# Patient Record
Sex: MALE | Race: WHITE | NOT HISPANIC OR LATINO | URBAN - METROPOLITAN AREA
[De-identification: names, ages, dates, MRNs, and addresses within clinical notes are randomized per-mention and may not be internally consistent; named-entity substitution may affect disease eponyms.]

---

## 2017-02-07 ENCOUNTER — OUTPATIENT (OUTPATIENT)
Dept: OUTPATIENT SERVICES | Facility: HOSPITAL | Age: 52
LOS: 1 days | End: 2017-02-07
Payer: COMMERCIAL

## 2017-02-07 ENCOUNTER — APPOINTMENT (OUTPATIENT)
Age: 52
End: 2017-02-07

## 2017-02-07 DIAGNOSIS — M54.16 RADICULOPATHY, LUMBAR REGION: ICD-10-CM

## 2017-02-07 PROCEDURE — 62323 NJX INTERLAMINAR LMBR/SAC: CPT

## 2017-08-29 ENCOUNTER — APPOINTMENT (OUTPATIENT)
Dept: NEUROLOGY | Facility: CLINIC | Age: 52
End: 2017-08-29
Payer: COMMERCIAL

## 2017-08-29 VITALS
WEIGHT: 235 LBS | BODY MASS INDEX: 34.8 KG/M2 | HEIGHT: 69 IN | DIASTOLIC BLOOD PRESSURE: 98 MMHG | HEART RATE: 84 BPM | SYSTOLIC BLOOD PRESSURE: 147 MMHG

## 2017-08-29 DIAGNOSIS — M51.24 OTHER INTERVERTEBRAL DISC DISPLACEMENT, THORACIC REGION: ICD-10-CM

## 2017-08-29 DIAGNOSIS — M54.16 RADICULOPATHY, LUMBAR REGION: ICD-10-CM

## 2017-08-29 DIAGNOSIS — G25.82 STIFF-MAN SYNDROME: ICD-10-CM

## 2017-08-29 PROCEDURE — 99205 OFFICE O/P NEW HI 60 MIN: CPT

## 2017-08-29 RX ORDER — BACLOFEN 10 MG/1
10 TABLET ORAL
Qty: 30 | Refills: 0 | Status: DISCONTINUED | COMMUNITY
Start: 2017-02-27

## 2017-08-29 RX ORDER — GABAPENTIN 800 MG/1
800 TABLET, COATED ORAL
Qty: 270 | Refills: 0 | Status: ACTIVE | COMMUNITY
Start: 2017-07-18

## 2017-08-29 RX ORDER — ETODOLAC 500 MG/1
500 TABLET, FILM COATED, EXTENDED RELEASE ORAL
Qty: 60 | Refills: 0 | Status: DISCONTINUED | COMMUNITY
Start: 2017-08-14

## 2017-08-31 LAB — GAD65 AB SER-MCNC: 0.01 NMOL/L

## 2017-09-05 LAB — PANC ISLET CELL AB SER QL: <5 JDF UNITS

## 2017-09-14 DIAGNOSIS — E80.20 UNSPECIFIED PORPHYRIA: ICD-10-CM

## 2017-09-22 ENCOUNTER — LABORATORY RESULT (OUTPATIENT)
Age: 52
End: 2017-09-22

## 2017-09-27 ENCOUNTER — LABORATORY RESULT (OUTPATIENT)
Age: 52
End: 2017-09-27

## 2017-10-05 ENCOUNTER — APPOINTMENT (OUTPATIENT)
Dept: NEUROLOGY | Facility: CLINIC | Age: 52
End: 2017-10-05
Payer: COMMERCIAL

## 2017-10-05 VITALS
HEART RATE: 91 BPM | SYSTOLIC BLOOD PRESSURE: 151 MMHG | DIASTOLIC BLOOD PRESSURE: 108 MMHG | BODY MASS INDEX: 34.8 KG/M2 | WEIGHT: 235 LBS | HEIGHT: 69 IN

## 2017-10-05 DIAGNOSIS — G89.4 CHRONIC PAIN SYNDROME: ICD-10-CM

## 2017-10-05 PROCEDURE — 99213 OFFICE O/P EST LOW 20 MIN: CPT

## 2017-11-07 ENCOUNTER — APPOINTMENT (OUTPATIENT)
Dept: NEUROLOGY | Facility: CLINIC | Age: 52
End: 2017-11-07

## 2017-11-27 ENCOUNTER — APPOINTMENT (OUTPATIENT)
Dept: CT IMAGING | Facility: CLINIC | Age: 52
End: 2017-11-27
Payer: COMMERCIAL

## 2017-11-27 ENCOUNTER — OUTPATIENT (OUTPATIENT)
Dept: OUTPATIENT SERVICES | Facility: HOSPITAL | Age: 52
LOS: 1 days | End: 2017-11-27
Payer: COMMERCIAL

## 2017-11-27 DIAGNOSIS — Z00.8 ENCOUNTER FOR OTHER GENERAL EXAMINATION: ICD-10-CM

## 2017-11-27 PROCEDURE — 70486 CT MAXILLOFACIAL W/O DYE: CPT

## 2017-11-27 PROCEDURE — 70486 CT MAXILLOFACIAL W/O DYE: CPT | Mod: 26

## 2017-12-07 ENCOUNTER — APPOINTMENT (OUTPATIENT)
Dept: MRI IMAGING | Facility: HOSPITAL | Age: 52
End: 2017-12-07

## 2017-12-07 ENCOUNTER — OUTPATIENT (OUTPATIENT)
Dept: OUTPATIENT SERVICES | Facility: HOSPITAL | Age: 52
LOS: 1 days | End: 2017-12-07
Payer: COMMERCIAL

## 2017-12-07 VITALS
TEMPERATURE: 97 F | SYSTOLIC BLOOD PRESSURE: 130 MMHG | HEART RATE: 85 BPM | DIASTOLIC BLOOD PRESSURE: 70 MMHG | OXYGEN SATURATION: 100 % | RESPIRATION RATE: 16 BRPM

## 2017-12-07 VITALS
OXYGEN SATURATION: 97 % | SYSTOLIC BLOOD PRESSURE: 137 MMHG | HEART RATE: 88 BPM | RESPIRATION RATE: 16 BRPM | DIASTOLIC BLOOD PRESSURE: 80 MMHG

## 2017-12-07 DIAGNOSIS — I67.1 CEREBRAL ANEURYSM, NONRUPTURED: ICD-10-CM

## 2017-12-07 PROCEDURE — A9585: CPT

## 2017-12-07 PROCEDURE — 72156 MRI NECK SPINE W/O & W/DYE: CPT | Mod: 26

## 2017-12-07 PROCEDURE — 72156 MRI NECK SPINE W/O & W/DYE: CPT

## 2017-12-07 PROCEDURE — 70553 MRI BRAIN STEM W/O & W/DYE: CPT

## 2017-12-07 PROCEDURE — 70553 MRI BRAIN STEM W/O & W/DYE: CPT | Mod: 26

## 2018-02-06 ENCOUNTER — APPOINTMENT (OUTPATIENT)
Age: 53
End: 2018-02-06

## 2018-03-13 ENCOUNTER — OUTPATIENT (OUTPATIENT)
Dept: OUTPATIENT SERVICES | Facility: HOSPITAL | Age: 53
LOS: 1 days | End: 2018-03-13
Payer: COMMERCIAL

## 2018-03-13 ENCOUNTER — APPOINTMENT (OUTPATIENT)
Age: 53
End: 2018-03-13

## 2018-03-13 DIAGNOSIS — M54.16 RADICULOPATHY, LUMBAR REGION: ICD-10-CM

## 2018-03-13 DIAGNOSIS — M54.17 RADICULOPATHY, LUMBOSACRAL REGION: ICD-10-CM

## 2018-03-13 PROCEDURE — 64484 NJX AA&/STRD TFRM EPI L/S EA: CPT

## 2018-03-13 PROCEDURE — 64483 NJX AA&/STRD TFRM EPI L/S 1: CPT

## 2018-08-10 ENCOUNTER — APPOINTMENT (OUTPATIENT)
Dept: PULMONOLOGY | Facility: CLINIC | Age: 53
End: 2018-08-10

## 2018-08-10 ENCOUNTER — APPOINTMENT (OUTPATIENT)
Dept: PULMONOLOGY | Facility: CLINIC | Age: 53
End: 2018-08-10
Payer: COMMERCIAL

## 2018-08-10 VITALS
OXYGEN SATURATION: 100 % | BODY MASS INDEX: 34.8 KG/M2 | WEIGHT: 235 LBS | SYSTOLIC BLOOD PRESSURE: 138 MMHG | HEIGHT: 69 IN | HEART RATE: 104 BPM | DIASTOLIC BLOOD PRESSURE: 91 MMHG

## 2018-08-10 DIAGNOSIS — Z87.891 PERSONAL HISTORY OF NICOTINE DEPENDENCE: ICD-10-CM

## 2018-08-10 LAB — POCT - HEMOGLOBIN (HGB), QUANTITATIVE, TRANSCUTANEOUS: 16

## 2018-08-10 PROCEDURE — 94726 PLETHYSMOGRAPHY LUNG VOLUMES: CPT

## 2018-08-10 PROCEDURE — 94060 EVALUATION OF WHEEZING: CPT

## 2018-08-10 PROCEDURE — 94750: CPT

## 2018-08-10 PROCEDURE — 88738 HGB QUANT TRANSCUTANEOUS: CPT

## 2018-08-10 PROCEDURE — 94729 DIFFUSING CAPACITY: CPT

## 2018-08-10 PROCEDURE — 99204 OFFICE O/P NEW MOD 45 MIN: CPT | Mod: 25

## 2018-08-10 RX ORDER — TRAZODONE HYDROCHLORIDE 50 MG/1
50 TABLET ORAL
Qty: 90 | Refills: 0 | Status: DISCONTINUED | COMMUNITY
Start: 2017-05-08 | End: 2018-08-10

## 2018-08-10 RX ORDER — OXYCODONE HYDROCHLORIDE 30 MG/1
30 TABLET, FILM COATED, EXTENDED RELEASE ORAL
Qty: 60 | Refills: 0 | Status: DISCONTINUED | COMMUNITY
Start: 2017-03-27 | End: 2018-08-10

## 2018-08-10 RX ORDER — CYCLOBENZAPRINE HYDROCHLORIDE 10 MG/1
10 TABLET, FILM COATED ORAL
Refills: 0 | Status: ACTIVE | COMMUNITY

## 2018-08-10 RX ORDER — DIAZEPAM 10 MG/1
10 TABLET ORAL
Qty: 30 | Refills: 0 | Status: DISCONTINUED | COMMUNITY
Start: 2017-02-27 | End: 2018-08-10

## 2018-08-10 RX ORDER — OXYCODONE HYDROCHLORIDE 15 MG/1
15 TABLET ORAL
Qty: 180 | Refills: 0 | Status: DISCONTINUED | COMMUNITY
Start: 2017-02-27 | End: 2018-08-10

## 2018-08-10 RX ORDER — DULOXETINE HYDROCHLORIDE 60 MG/1
60 CAPSULE, DELAYED RELEASE PELLETS ORAL
Qty: 90 | Refills: 0 | Status: DISCONTINUED | COMMUNITY
Start: 2017-07-18 | End: 2018-08-10

## 2018-09-03 ENCOUNTER — FORM ENCOUNTER (OUTPATIENT)
Age: 53
End: 2018-09-03

## 2018-09-07 ENCOUNTER — APPOINTMENT (OUTPATIENT)
Dept: PULMONOLOGY | Facility: CLINIC | Age: 53
End: 2018-09-07
Payer: MEDICARE

## 2018-09-07 VITALS — OXYGEN SATURATION: 99 % | SYSTOLIC BLOOD PRESSURE: 151 MMHG | HEART RATE: 105 BPM | DIASTOLIC BLOOD PRESSURE: 99 MMHG

## 2018-09-07 DIAGNOSIS — M79.7 FIBROMYALGIA: ICD-10-CM

## 2018-09-07 PROCEDURE — 99213 OFFICE O/P EST LOW 20 MIN: CPT

## 2018-09-07 RX ORDER — LORAZEPAM 2 MG/1
TABLET ORAL
Refills: 0 | Status: DISCONTINUED | COMMUNITY
End: 2018-09-07

## 2018-09-08 PROBLEM — M79.7 FIBROMYALGIA: Status: ACTIVE | Noted: 2018-09-08

## 2018-10-10 ENCOUNTER — EMERGENCY (EMERGENCY)
Facility: HOSPITAL | Age: 53
LOS: 1 days | Discharge: HOME | End: 2018-10-10
Attending: EMERGENCY MEDICINE | Admitting: EMERGENCY MEDICINE
Payer: COMMERCIAL

## 2018-10-10 VITALS
OXYGEN SATURATION: 100 % | DIASTOLIC BLOOD PRESSURE: 98 MMHG | SYSTOLIC BLOOD PRESSURE: 153 MMHG | TEMPERATURE: 98 F | RESPIRATION RATE: 16 BRPM | HEART RATE: 84 BPM

## 2018-10-10 VITALS
HEART RATE: 116 BPM | RESPIRATION RATE: 18 BRPM | DIASTOLIC BLOOD PRESSURE: 116 MMHG | OXYGEN SATURATION: 99 % | TEMPERATURE: 99 F | SYSTOLIC BLOOD PRESSURE: 167 MMHG

## 2018-10-10 LAB
ALBUMIN SERPL ELPH-MCNC: 4.3 G/DL — SIGNIFICANT CHANGE UP (ref 3.3–5)
ALP SERPL-CCNC: 60 U/L — SIGNIFICANT CHANGE UP (ref 40–120)
ALT FLD-CCNC: 29 U/L — SIGNIFICANT CHANGE UP (ref 4–41)
APTT BLD: 26.8 SEC — LOW (ref 27.5–37.4)
AST SERPL-CCNC: 29 U/L — SIGNIFICANT CHANGE UP (ref 4–40)
BASOPHILS # BLD AUTO: 0.09 K/UL — SIGNIFICANT CHANGE UP (ref 0–0.2)
BASOPHILS NFR BLD AUTO: 1.1 % — SIGNIFICANT CHANGE UP (ref 0–2)
BILIRUB SERPL-MCNC: 0.3 MG/DL — SIGNIFICANT CHANGE UP (ref 0.2–1.2)
BUN SERPL-MCNC: 14 MG/DL — SIGNIFICANT CHANGE UP (ref 7–23)
CALCIUM SERPL-MCNC: 9.3 MG/DL — SIGNIFICANT CHANGE UP (ref 8.4–10.5)
CHLORIDE SERPL-SCNC: 100 MMOL/L — SIGNIFICANT CHANGE UP (ref 98–107)
CO2 SERPL-SCNC: 21 MMOL/L — LOW (ref 22–31)
CREAT SERPL-MCNC: 0.77 MG/DL — SIGNIFICANT CHANGE UP (ref 0.5–1.3)
EOSINOPHIL # BLD AUTO: 0.07 K/UL — SIGNIFICANT CHANGE UP (ref 0–0.5)
EOSINOPHIL NFR BLD AUTO: 0.9 % — SIGNIFICANT CHANGE UP (ref 0–6)
GLUCOSE SERPL-MCNC: 109 MG/DL — HIGH (ref 70–99)
HCT VFR BLD CALC: 44 % — SIGNIFICANT CHANGE UP (ref 39–50)
HGB BLD-MCNC: 15.5 G/DL — SIGNIFICANT CHANGE UP (ref 13–17)
IMM GRANULOCYTES # BLD AUTO: 0.03 # — SIGNIFICANT CHANGE UP
IMM GRANULOCYTES NFR BLD AUTO: 0.4 % — SIGNIFICANT CHANGE UP (ref 0–1.5)
INR BLD: 1.04 — SIGNIFICANT CHANGE UP (ref 0.88–1.17)
LYMPHOCYTES # BLD AUTO: 2.2 K/UL — SIGNIFICANT CHANGE UP (ref 1–3.3)
LYMPHOCYTES # BLD AUTO: 27.7 % — SIGNIFICANT CHANGE UP (ref 13–44)
MCHC RBC-ENTMCNC: 30.3 PG — SIGNIFICANT CHANGE UP (ref 27–34)
MCHC RBC-ENTMCNC: 35.2 % — SIGNIFICANT CHANGE UP (ref 32–36)
MCV RBC AUTO: 86.1 FL — SIGNIFICANT CHANGE UP (ref 80–100)
MONOCYTES # BLD AUTO: 0.67 K/UL — SIGNIFICANT CHANGE UP (ref 0–0.9)
MONOCYTES NFR BLD AUTO: 8.4 % — SIGNIFICANT CHANGE UP (ref 2–14)
NEUTROPHILS # BLD AUTO: 4.89 K/UL — SIGNIFICANT CHANGE UP (ref 1.8–7.4)
NEUTROPHILS NFR BLD AUTO: 61.5 % — SIGNIFICANT CHANGE UP (ref 43–77)
NRBC # FLD: 0 — SIGNIFICANT CHANGE UP
PLATELET # BLD AUTO: 288 K/UL — SIGNIFICANT CHANGE UP (ref 150–400)
PMV BLD: 10.9 FL — SIGNIFICANT CHANGE UP (ref 7–13)
POTASSIUM SERPL-MCNC: 4 MMOL/L — SIGNIFICANT CHANGE UP (ref 3.5–5.3)
POTASSIUM SERPL-SCNC: 4 MMOL/L — SIGNIFICANT CHANGE UP (ref 3.5–5.3)
PROT SERPL-MCNC: 7.6 G/DL — SIGNIFICANT CHANGE UP (ref 6–8.3)
PROTHROM AB SERPL-ACNC: 11.5 SEC — SIGNIFICANT CHANGE UP (ref 9.8–13.1)
RBC # BLD: 5.11 M/UL — SIGNIFICANT CHANGE UP (ref 4.2–5.8)
RBC # FLD: 13 % — SIGNIFICANT CHANGE UP (ref 10.3–14.5)
SODIUM SERPL-SCNC: 135 MMOL/L — SIGNIFICANT CHANGE UP (ref 135–145)
TROPONIN T, HIGH SENSITIVITY: < 6 NG/L — SIGNIFICANT CHANGE UP (ref ?–14)
WBC # BLD: 7.95 K/UL — SIGNIFICANT CHANGE UP (ref 3.8–10.5)
WBC # FLD AUTO: 7.95 K/UL — SIGNIFICANT CHANGE UP (ref 3.8–10.5)

## 2018-10-10 PROCEDURE — 70450 CT HEAD/BRAIN W/O DYE: CPT | Mod: 26,59

## 2018-10-10 PROCEDURE — 70498 CT ANGIOGRAPHY NECK: CPT | Mod: 26

## 2018-10-10 PROCEDURE — 99284 EMERGENCY DEPT VISIT MOD MDM: CPT

## 2018-10-10 PROCEDURE — 70496 CT ANGIOGRAPHY HEAD: CPT | Mod: 26

## 2018-10-10 PROCEDURE — 99285 EMERGENCY DEPT VISIT HI MDM: CPT

## 2018-10-10 NOTE — ED PROVIDER NOTE - MEDICAL DECISION MAKING DETAILS
Gwendolyn:  pt with sudden onset of R facial numbness, headache and gait disturbance, h/o similar episode 1 yr ago w non-specific signal abnormality on MRI brain, followed by Dr. hopkins (neuro).  pt will need repeat imaging and neuro consult in ED.

## 2018-10-10 NOTE — ED PROVIDER NOTE - OBJECTIVE STATEMENT
53 year old M with hx of HLD, fibromyalgia, presents with complaint of numbness and "tight" pain to right side of body.  He states that he was driving his car at 730 this am when he had sudden onset numbness and pain to right LE, UE right anterior neck and right upper lip associated with feeling of disorientation and mild weakness to R side and right sided headache.  He states that symptoms were 8/10 at that time. He then drove to physical therapy where symptoms decreased to 4/10. After PT, symptoms worsened and he noted unsteady gait. He then came to ED.  He denies slurred speech, dizziness, chest pains, shortness of breath, neck stiffness or fever.  Patient states that he had similar symptoms about a year ago and he was evaluated for stroke at that time.  He had abnormal CT findings for which he underwent outpatient MRI which showed possible demyelinating disease but after following up with a neuromuscular specialist, he was told he did not have demyelinating disease.

## 2018-10-10 NOTE — CONSULT NOTE ADULT - ATTENDING COMMENTS
53-year-old right-handed white gentleman first evaluated at [facility] on 10/10/18 with right-sided numbness and imbalance. He has a "small fiber neuropathy" followed by Dr. Schumacher and so has some degree of chronic numbness in his feet. On 10/9/18, he developed right-sided numbness including leg, arm and right side of his lips. At the same time, he developed imbalance, nausea and had a bifrontal headache. There is a remote history of cocaine abuse, smoking and alcohol abuse, but none for the past 15 years. ROS otherwise negative. Exam. Alert and attentive; no limb ataxia; no right-sided sensory deficits; gait not tested; remainder of neurologic exam was nonfocal. CT head (10/9/18) to my eye was unremarkable. Impression. On 10/9/18 he developed right-sided numbness and imbalance. His presentation may localize to the left side of brain, perhaps thalamic or even brain stem although the localization remains uncertain. Etiology is unclear, but a small ischemic stroke is possible, of uncertain mechanism. Suggest. MRI brain/MRA neck and head (needs at least p.o. sedation and possibly may require anesthesia); CTA neck and head can be done instead of MRA if MRI/MRA is delayed; aspirin 81 mg daily; atorvastatin 80 mg daily-can be adjusted depending on LDL; PT/OT; PM&R consultation.

## 2018-10-10 NOTE — CONSULT NOTE ADULT - ASSESSMENT
53y RH  Male PMH small fiber neuropathy, multiple C spine fracture from fall s/p repair in 2015, umbilical hernia s/p repair p/w R sided numbness, dizziness, headache. NIHSS 0, MRS 0. CT head negative for infarct, hemorrhage, mass effect. TPA not given due to mild to absent deficits. Patient with prior episode w/ similar symptoms earlier in the year, states that he's received workup for it but no record in the system. Prior MRI in 12/17 w/ nonspecific T2/Flair signal changes. R sided numbness, dizziness 2/2 L brain dysfunction 2/2 unknown etiology. Given hx of prior trauma, C-spine fracture/surgery, would recommend additional vessel imaging.     Impression: R sided numbness, dizziness 2/2 L brain dysfunction 2/2 unknown etiology    Recs:  Would get MRI brain without contrast to look at the extent and distribution of the stroke  CTA H/N to look at the cerebral vasculature.   Aspirin for secondary stroke prevention at this time. Atorvastatin 80, titrate the dose according to LDL.   DVT prophylaxis, Neurochecks  Permissive HTN up to 220/120 mmHg for first 24 hours after the symptom onset followed by gradual normotension.   HbA1C and LDL.

## 2018-10-10 NOTE — ED ADULT TRIAGE NOTE - CHIEF COMPLAINT QUOTE
c/o right side body weakness and severe headache one hour ago. Patient is evaluated by Dr. Keenan, code lauro was called . Patient is directed to CT-Scan, charge nurse informed. Hx: TIA

## 2018-10-10 NOTE — ED PROVIDER NOTE - PROGRESS NOTE DETAILS
[employer]:  Code stroke note.  pt presents with feeling off for 1 hour.  pt states he had acute onset of numbness and heaviness on the right side.  states his gait was off.  he felt disoriented but that is now better.  had headache at the time.  no motor defect at this time but feels 'off' and numb on the right. Neurology spoke with patient's private neurologist who recoments MRI,. CTA negative for acute pathology.   as outpatient. Patient feeling well and symptoms have resolved at this time.  Patient offered placement in observation to obtain MRI. patient states that he would like to get MRI at home.  Risks and benefits of outpatient vs inpatient MRI discussed and shared decision making implemented.  Patient again states he prefers outpatient work up.  He is neurologically intact with steady gait.

## 2018-10-10 NOTE — ED ADULT NURSE NOTE - NSIMPLEMENTINTERV_GEN_ALL_ED
Implemented All Fall Risk Interventions:  [place] to call system. Call bell, personal items and telephone within reach. Instruct patient to call for assistance. Room bathroom lighting operational. Non-slip footwear when patient is off stretcher. Physically safe environment: no spills, clutter or unnecessary equipment. Stretcher in lowest position, wheels locked, appropriate side rails in place. Provide visual cue, wrist band, yellow gown, etc. Monitor gait and stability. Monitor for mental status changes and reorient to person, place, and time. Review medications for side effects contributing to fall risk. Reinforce activity limits and safety measures with patient and family.

## 2018-10-10 NOTE — ED PROVIDER NOTE - CHIEF COMPLAINT
The patient is a 53y Male complaining of The patient is a 53y Male complaining of numbness to right side

## 2018-10-10 NOTE — ED ADULT NURSE NOTE - OBJECTIVE STATEMENT
Pt presents to room 4 as a code stroke, A&Ox3, ambulatory at baseline without assistance, here for evaluation of sudden onset right sided arm and leg heaviness with numbness and tingling, right sided headache, right sided lip numbness and tingling, and abnormal gait. pt also reports feeling off. Denies any chest pain, headache, slurred speech, dizziness, arm drift, nausea, vomiting, shortness of breath, palpitations, diarrhea, fever, constipation, or chills. IV established in left ac with a 20g, labs drawn and sent, call bell in reach, side rails up, bed in locked position, md evaluation in progress, pt on telemetry-NSR @ 86 noted, will continue to monitor.

## 2018-10-10 NOTE — CONSULT NOTE ADULT - SUBJECTIVE AND OBJECTIVE BOX
Neurology Consult    Reason for consult: R sided numbness, dizziness    HPI:  53y RH  Male PMH small fiber neuropathy, multiple C spine fracture s/p repair in 2015, umbilical hernia s/p repair p/w R sided numbness, dizziness. LKW 0730. At 745 day of admission, patient was driving when he started feeling disoriented, felt that his R face, arm, leg felt numb. He also complained of dizziness(described as lightheadedness, not room spinning) as well as headache(8/10 intensity, bitemporal, worsening with palpation, sharp). As per patient, has had a similar event in March 2018 with R sided numbness and dizziness which was less intense prior. Patient also has a hx of neuropathic pain for which he follows up with two neurologists(Dr. Rosado, Dr. Benavidez), is taking gabapentin for his pain; he recently had a skin biopsy which was positive for small fiber neuropathy but etiology currently uncertain.     REVIEW OF SYSTEMS:  As above    MEDICATIONS    PMH:      PSH: Quadriceps tendon rupture  Incarcerated umbilical hernia    FAMILY HISTORY:    No history of dementia, strokes, or seizures     SOCIAL HISTORY:  No history of tobacco or alcohol use     Allergies    Mobic (Urticaria; Rash)    Intolerances    Vital Signs Last 24 Hrs  T(C): 37.1 (10 Oct 2018 09:19), Max: 37.1 (10 Oct 2018 09:19)  T(F): 98.7 (10 Oct 2018 09:19), Max: 98.7 (10 Oct 2018 09:19)  HR: 116 (10 Oct 2018 09:19) (116 - 116)  BP: 167/116 (10 Oct 2018 09:19) (167/116 - 167/116)  BP(mean): --  RR: 18 (10 Oct 2018 09:19) (18 - 18)  SpO2: 99% (10 Oct 2018 09:19) (99% - 99%)    Neurological Examination:    Mental Status: Patient is alert, awake, oriented X3. Patient is fluent, no dysarthria, no aphasia. Follows commands well and able to answer questions appropriately. Mood and affect normal.    Cranial Nerves: PERRL, EOMI, visual field intact, V1-V3 intact, no gross facial asymmetry, tongue/uvula midline    Motor Exam: No drift  Right upper extremity: 5/5  Left upper extremity: 5/5  Right lower extremity: 5/5  Left lower extremity: 5/5    Normal bulk/tone    Sensory: Intact to light touch bilaterally. No extinction    Coordination: Finger to nose intact bilaterally     Gait: Normal      Reflexes: Bilateral 2+ Biceps, Brachial, Patellar, Ankle  Plantar: Down bilateral    GENERAL: No acute distress  HEENT:  Normocephalic, atraumatic  EXTREMITIES: No edema, clubbing, cyanosis  MUSCULOSKELETAL: Normal range of motion  SKIN: No rashes    LABS:    Hemoglobin A1C:     RADIOLOGY:  CT head:  MRI:

## 2018-12-14 ENCOUNTER — APPOINTMENT (OUTPATIENT)
Dept: PULMONOLOGY | Facility: CLINIC | Age: 53
End: 2018-12-14
Payer: COMMERCIAL

## 2018-12-14 VITALS
HEART RATE: 99 BPM | BODY MASS INDEX: 31.84 KG/M2 | DIASTOLIC BLOOD PRESSURE: 91 MMHG | OXYGEN SATURATION: 98 % | TEMPERATURE: 98.2 F | HEIGHT: 69 IN | RESPIRATION RATE: 16 BRPM | WEIGHT: 215 LBS | SYSTOLIC BLOOD PRESSURE: 142 MMHG

## 2018-12-14 PROBLEM — M79.7 FIBROMYALGIA: Chronic | Status: ACTIVE | Noted: 2018-10-10

## 2018-12-14 PROBLEM — E78.5 HYPERLIPIDEMIA, UNSPECIFIED: Chronic | Status: ACTIVE | Noted: 2018-10-10

## 2018-12-14 PROCEDURE — 99213 OFFICE O/P EST LOW 20 MIN: CPT

## 2018-12-28 ENCOUNTER — APPOINTMENT (OUTPATIENT)
Dept: ANESTHESIOLOGY | Facility: CLINIC | Age: 53
End: 2018-12-28

## 2018-12-28 ENCOUNTER — OUTPATIENT (OUTPATIENT)
Dept: OUTPATIENT SERVICES | Facility: HOSPITAL | Age: 53
LOS: 1 days | End: 2018-12-28
Payer: COMMERCIAL

## 2018-12-28 DIAGNOSIS — M54.16 RADICULOPATHY, LUMBAR REGION: ICD-10-CM

## 2018-12-28 PROCEDURE — 64483 NJX AA&/STRD TFRM EPI L/S 1: CPT

## 2018-12-30 PROCEDURE — 95813 EEG EXTND MNTR 61-119 MIN: CPT

## 2018-12-30 PROCEDURE — G0400: CPT

## 2019-01-07 DIAGNOSIS — M54.17 RADICULOPATHY, LUMBOSACRAL REGION: ICD-10-CM

## 2019-01-09 ENCOUNTER — FORM ENCOUNTER (OUTPATIENT)
Age: 54
End: 2019-01-09

## 2019-01-11 ENCOUNTER — APPOINTMENT (OUTPATIENT)
Dept: PULMONOLOGY | Facility: CLINIC | Age: 54
End: 2019-01-11
Payer: COMMERCIAL

## 2019-01-11 VITALS
RESPIRATION RATE: 16 BRPM | OXYGEN SATURATION: 96 % | HEIGHT: 69 IN | DIASTOLIC BLOOD PRESSURE: 93 MMHG | HEART RATE: 88 BPM | TEMPERATURE: 98.6 F | SYSTOLIC BLOOD PRESSURE: 145 MMHG | BODY MASS INDEX: 31.84 KG/M2 | WEIGHT: 215 LBS

## 2019-01-11 PROCEDURE — 99213 OFFICE O/P EST LOW 20 MIN: CPT

## 2019-01-12 NOTE — HISTORY OF PRESENT ILLNESS
[FreeTextEntry1] : Patient here for followup of overnight sleep  study.  study demonstrated evidence of significant sleep stage misperception with a much higher sleep percentage than the patient reported. In addition loud snoring was noted. The patient does note that on the night of the study he took both Valium and trazodone which is unusual for him he was concerned that he otherwise would not sleep.\par \par We had a long discussion about best to go forward. He does have a report of significant insomnia and has been refractory to most medications. He demonstrates sleep state misperception and has findings that suggest apnea. I suggest he undergo laboratory polysomnography for assessment of TESSA. There is a concern that he may not sleep in the lab and that is a legitimate concern. However I don't think that I will be able to treat him for sleep apnea based on a home study. I don't think that he will have a reliable enough recording on a home study.

## 2019-03-31 ENCOUNTER — FORM ENCOUNTER (OUTPATIENT)
Age: 54
End: 2019-03-31

## 2019-04-16 ENCOUNTER — APPOINTMENT (OUTPATIENT)
Dept: PULMONOLOGY | Facility: CLINIC | Age: 54
End: 2019-04-16
Payer: MEDICARE

## 2019-04-16 VITALS
DIASTOLIC BLOOD PRESSURE: 99 MMHG | OXYGEN SATURATION: 97 % | SYSTOLIC BLOOD PRESSURE: 138 MMHG | TEMPERATURE: 98.5 F | HEART RATE: 95 BPM

## 2019-04-16 DIAGNOSIS — G47.00 INSOMNIA, UNSPECIFIED: ICD-10-CM

## 2019-04-16 DIAGNOSIS — G47.33 OBSTRUCTIVE SLEEP APNEA (ADULT) (PEDIATRIC): ICD-10-CM

## 2019-04-16 PROCEDURE — 99213 OFFICE O/P EST LOW 20 MIN: CPT

## 2019-04-16 NOTE — ASSESSMENT
[FreeTextEntry1] : Sleep apnea combined with insomnia. Recommend cognitive behavioral therapy and oral appliance therapy.

## 2019-04-16 NOTE — HISTORY OF PRESENT ILLNESS
[FreeTextEntry1] : Sleep apnea and insomnia. Here for followup. Underwent laboratory sleep study. Patient reports that he subjectively slept better in the laboratory then he would have at home.Continues to have insomnia issues. Having cranial headaches related to muscle spasms as well

## 2021-08-20 NOTE — STROKE CODE NOTE - MILD OR ISOLATED NEUROLOGICAL DEFICITS (EXCEPT FOR APHASIA OR HEMIANOPSIA)
Interesting article:  https://sony.BuyWithMe/automatic-weight-[email or web address]    The Case for Richard Aguiar  
Statement Selected

## 2022-07-20 ENCOUNTER — APPOINTMENT (OUTPATIENT)
Dept: MRI IMAGING | Facility: CLINIC | Age: 57
End: 2022-07-20

## 2022-08-18 ENCOUNTER — APPOINTMENT (OUTPATIENT)
Dept: PODIATRY | Facility: CLINIC | Age: 57
End: 2022-08-18

## 2022-08-18 VITALS — HEIGHT: 69 IN | WEIGHT: 200 LBS | BODY MASS INDEX: 29.62 KG/M2

## 2022-08-18 DIAGNOSIS — S90.122A CONTUSION OF LEFT LESSER TOE(S) W/OUT DAMAGE TO NAIL, INITIAL ENCOUNTER: ICD-10-CM

## 2022-08-18 DIAGNOSIS — S92.354A NONDISPLACED FRACTURE OF FIFTH METATARSAL BONE, RIGHT FOOT, INITIAL ENCOUNTER FOR CLOSED FRACTURE: ICD-10-CM

## 2022-08-18 DIAGNOSIS — M19.071 PRIMARY OSTEOARTHRITIS, RIGHT ANKLE AND FOOT: ICD-10-CM

## 2022-08-18 DIAGNOSIS — M77.42 METATARSALGIA, LEFT FOOT: ICD-10-CM

## 2022-08-18 DIAGNOSIS — S90.31XA CONTUSION OF RIGHT FOOT, INITIAL ENCOUNTER: ICD-10-CM

## 2022-08-18 DIAGNOSIS — M79.661 PAIN IN RIGHT LOWER LEG: ICD-10-CM

## 2022-08-18 DIAGNOSIS — M84.374D STRESS FRACTURE, RIGHT FOOT, SUBSEQUENT ENCOUNTER FOR FRACTURE WITH ROUTINE HEALING: ICD-10-CM

## 2022-08-18 DIAGNOSIS — S92.534A NONDISPLACED FRACTURE OF DISTAL PHALANX OF RIGHT LESSER TOE(S), INITIAL ENCOUNTER FOR CLOSED FRACTURE: ICD-10-CM

## 2022-08-18 DIAGNOSIS — M79.674 PAIN IN RIGHT TOE(S): ICD-10-CM

## 2022-08-18 DIAGNOSIS — M25.471 EFFUSION, RIGHT ANKLE: ICD-10-CM

## 2022-08-18 DIAGNOSIS — M84.374A STRESS FRACTURE, RIGHT FOOT, INITIAL ENCOUNTER FOR FRACTURE: ICD-10-CM

## 2022-08-18 DIAGNOSIS — Z91.89 OTHER SPECIFIED PERSONAL RISK FACTORS, NOT ELSEWHERE CLASSIFIED: ICD-10-CM

## 2022-08-18 DIAGNOSIS — M77.31 CALCANEAL SPUR, RIGHT FOOT: ICD-10-CM

## 2022-08-18 DIAGNOSIS — S93.505A UNSPECIFIED SPRAIN OF LEFT LESSER TOE(S), INITIAL ENCOUNTER: ICD-10-CM

## 2022-08-18 DIAGNOSIS — M77.32 CALCANEAL SPUR, LEFT FOOT: ICD-10-CM

## 2022-08-18 DIAGNOSIS — M77.41 METATARSALGIA, RIGHT FOOT: ICD-10-CM

## 2022-08-18 DIAGNOSIS — R26.2 DIFFICULTY IN WALKING, NOT ELSEWHERE CLASSIFIED: ICD-10-CM

## 2022-08-18 DIAGNOSIS — M79.675 PAIN IN LEFT TOE(S): ICD-10-CM

## 2022-08-18 DIAGNOSIS — S90.121A CONTUSION OF RIGHT LESSER TOE(S) W/OUT DAMAGE TO NAIL, INITIAL ENCOUNTER: ICD-10-CM

## 2022-08-18 DIAGNOSIS — R60.0 LOCALIZED EDEMA: ICD-10-CM

## 2022-08-18 PROCEDURE — 99212 OFFICE O/P EST SF 10 MIN: CPT | Mod: 25

## 2022-08-18 PROCEDURE — 11719 TRIM NAIL(S) ANY NUMBER: CPT | Mod: 59

## 2022-08-18 PROCEDURE — 11056 PARNG/CUTG B9 HYPRKR LES 2-4: CPT

## 2022-08-18 RX ORDER — MILNACIPRAN HYDROCHLORIDE 12.5-25-5
12.5 & 25 & 5 KIT ORAL
Refills: 0 | Status: DISCONTINUED | COMMUNITY
End: 2022-08-18

## 2022-08-18 RX ORDER — DICLOFENAC SODIUM 75 MG/1
75 TABLET, DELAYED RELEASE ORAL
Refills: 0 | Status: ACTIVE | COMMUNITY

## 2022-08-18 RX ORDER — TRAMADOL HYDROCHLORIDE 50 MG/1
50 TABLET, COATED ORAL
Refills: 0 | Status: ACTIVE | COMMUNITY

## 2022-08-18 RX ORDER — IBUPROFEN AND FAMOTIDINE 800; 26.6 MG/1; MG/1
TABLET, COATED ORAL
Refills: 0 | Status: DISCONTINUED | COMMUNITY
End: 2022-08-18

## 2022-08-18 RX ORDER — TAMSULOSIN HYDROCHLORIDE 0.4 MG/1
0.4 CAPSULE ORAL
Refills: 0 | Status: DISCONTINUED | COMMUNITY
End: 2022-08-18

## 2022-08-18 RX ORDER — CHLORZOXAZONE 250 MG/1
250 TABLET ORAL
Refills: 0 | Status: ACTIVE | COMMUNITY

## 2022-08-18 RX ORDER — OXYCODONE 20 MG/1
20 TABLET ORAL
Refills: 0 | Status: ACTIVE | COMMUNITY

## 2022-08-18 RX ORDER — PRAVASTATIN SODIUM 40 MG/1
40 TABLET ORAL
Refills: 0 | Status: ACTIVE | COMMUNITY

## 2022-08-23 NOTE — HISTORY OF PRESENT ILLNESS
[Sneakers] : wai [FreeTextEntry1] : Patient presents today multiple problems.  Patient has idiopathic neuropathy with pins and needles; decreased sensation in the forefeet.  Patient also has elongated toenails.   Patient also has plantar foot pain and he thinks that he has worsening hammertoes.  Patient has bilateral heel pain and he has a history of heel pain.  Most recently the pain has worsened after wearing non-supportive slippers.  Previously, his plantar fasciitis was managed with injection therapy.  Currently, he wears custom orthotics; however, the uppers are very worn out.

## 2022-08-23 NOTE — PROCEDURE
[FreeTextEntry1] : Impression: Pain in right foot.  Pain in left foot.  Onychodystrophy.  Idiopathic neuropathy.  Hammertoes.  Metatarsalgia, bilateral.  Plantar fasciitis, bilateral.  Hyperkeratosis, bilateral.\par \par Treatment: Discussed patient conditions, etiology and treatment options.  Patient has an NSAID allergy, therefore prescribed a Medrol dose pack to decrease inflammation.\par Added metatarsal pads to the orthotics to decrease the pressure on the metatarsal heads.  Would like the patient to have the orthotics refurbished as the current decreased padding is causing increased pressure and possible exacerbation of symptoms.  \par Discussed stretching exercises for the plantar fasciitis.  If not improved at next appointment, will consider injection therapy at that time. \par Nails 1 through 5, bilateral were trimmed with sterile nippers.  The hyperkeratotic lesions were pared with sterile #23 blade.\par Follow up in 2 - 3 weeks.  Avoid walking barefoot and neuropathic precautions discussed.

## 2022-08-23 NOTE — PHYSICAL EXAM
[Ankle Swelling (On Exam)] : present [Ankle Swelling Bilaterally] : bilaterally  [Ankle Swelling On The Right] : mild [2+] : left foot dorsalis pedis 2+ [Vibration Dec.] : diminished vibratory sensation at the level of the toes [Position Sense Dec.] : diminished position sense at the level of the toes [Diminished Throughout Right Foot] : diminished sensation with monofilament testing throughout right foot [Diminished Throughout Left Foot] : diminished sensation with monofilament testing throughout left foot [FreeTextEntry3] : CFT: Instantaneous.  Temperature gradient: warm to cool. [de-identified] : Semi-flexible hammertoes 2 through 5, bilateral.  Muscle strength: 5/5.  Pedal joint ROM is intact.  Pain on palpation at the plantar met. heads 2 through 5, bilateral but no swelling noted.  No erythema.  Negative Muldner's click.  Pain on palpation at the medial calcaneal tubercles, bilateral with no edema or erythema.  There is bilateral Tailor's bunions. [FreeTextEntry1] : Decreased sharp/dull.  Decreased light touch.  Decreased hot/cold.  Positive Paresthesias.  5.07 monofilament is absent on the right 3, 5, submet. 2 and submet. 5 absent.

## 2022-09-01 ENCOUNTER — APPOINTMENT (OUTPATIENT)
Dept: PODIATRY | Facility: CLINIC | Age: 57
End: 2022-09-01

## 2022-09-13 ENCOUNTER — APPOINTMENT (OUTPATIENT)
Dept: PODIATRY | Facility: CLINIC | Age: 57
End: 2022-09-13

## 2022-09-13 VITALS — HEIGHT: 69 IN | BODY MASS INDEX: 29.62 KG/M2 | WEIGHT: 200 LBS

## 2022-09-13 DIAGNOSIS — M77.40 METATARSALGIA, UNSPECIFIED FOOT: ICD-10-CM

## 2022-09-13 PROCEDURE — 20600 DRAIN/INJ JOINT/BURSA W/O US: CPT | Mod: LT

## 2022-09-13 PROCEDURE — 73630 X-RAY EXAM OF FOOT: CPT | Mod: RT

## 2022-09-13 PROCEDURE — 99213 OFFICE O/P EST LOW 20 MIN: CPT | Mod: 25

## 2022-09-15 PROBLEM — M77.40 METATARSALGIA, UNSPECIFIED FOOT: Status: ACTIVE | Noted: 2022-08-23

## 2022-09-15 RX ORDER — METHYLPREDNISOLONE 4 MG/1
4 TABLET ORAL AS DIRECTED
Qty: 1 | Refills: 0 | Status: DISCONTINUED | COMMUNITY
Start: 2022-08-18 | End: 2022-09-15

## 2022-09-15 NOTE — PROCEDURE
[FreeTextEntry1] : X-rays: (3 views - bilateral feet) No acute fractures or dislocations noted.  No erosions noted.  On the right side, the patient has evidence of a healed metatarsal diaphysis fracture and a plantar calcaneal spur.  On the left side, patient has 5th metatarsal accessory bone, which is unchanged from prior.  Superior and inferior calcaneal spurs.  \par Vascular calcification is present, bilateral.  Hammertoe deformity 2 through 5, bilaterally.

## 2022-09-15 NOTE — PHYSICAL EXAM
[Ankle Swelling (On Exam)] : present [Ankle Swelling Bilaterally] : bilaterally  [Ankle Swelling On The Right] : mild [2+] : left foot dorsalis pedis 2+ no [Vibration Dec.] : diminished vibratory sensation at the level of the toes [Position Sense Dec.] : diminished position sense at the level of the toes [Diminished Throughout Right Foot] : diminished sensation with monofilament testing throughout right foot [Diminished Throughout Left Foot] : diminished sensation with monofilament testing throughout left foot [FreeTextEntry3] : CFT: Instantaneous.  Temperature gradient: warm to cool. [de-identified] : Semi-flexible hammertoes 2 through 5, bilateral.  Muscle strength: 5/5.  Pedal joint ROM is intact.  Pain on palpation at the plantar met. heads 2 through 5, bilateral but no swelling noted.  No erythema.  Negative Muldner's click.  Pain on palpation at the medial calcaneal tubercles, bilateral with no edema or erythema.  There is bilateral Tailor's bunions.  He has the most pain on palpation at the 2nd MPJ's, bilaterally with the left worse than the right.  No swelling, no erythema.   [FreeTextEntry1] : Decreased sharp/dull.  Decreased light touch.  Decreased hot/cold.  Positive Paresthesias.  5.07 monofilament is absent on the right 3, 5, submet. 2 and submet. 5 absent.

## 2022-09-15 NOTE — ASSESSMENT
[FreeTextEntry1] : Impression: Pain in right foot, pain in left foot (M79).  IIdiopathic neuropathy.  Hammertoes (M20.4).  Metatarsalgia, bilateral (77.4).  Plantar fasciitis, bilateral (M72.2).  Hyperkeratosis, bilateral (L85.1).  Bilateral capsulitis (M77.5).\par \par Treatment: Discussed patient conditions, etiology and treatment options.  \par I feel that the plantar fasciitis is improving and he is to continue current treatment options for that including the supportive shoes with orthotics, no barefoot walking and stretching.\par For the capsulitis, an injection was given into the left  2nd MPJ.\par Patient was placed in the treatment chair in supine position.  After obtaining verbal consent time, out was performed to the identified area of maximal tenderness over the joint site.  The foot was prepped utilizing 70% alcohol solution.  \par A local injection consisting of 0.5cc's of 0.5% Bupivacaine and 2mg of Dexamethasone was infiltrated into the joint.  Improvement in pain was observed following the injection. \par Patient was advised to apply intermittent ice when he gets home.  If he continues to have symptoms at follow up, an injection may be considered for the right side.  \par Advised patient to go to physical therapy.  Patient has a rheumatology appointment coming up as he had an episode of bilateral foot and hand swelling as well as a rash that was unexplained.  Rheumatologic conditions can worsen foot problems.  \par Patient is interested in refurbishment of the orthotics and he was referred to the orthotist.\par Return: 2 weeks.  \par \par

## 2022-09-15 NOTE — HISTORY OF PRESENT ILLNESS
[Sneakers] : wai [FreeTextEntry1] : Patient presents today for management of bilateral heel pain and bilateral metatarsalgia.  The heel pain has improved as well as the metatarsal, bilateral with a Medrol dose pack; however, once he stopped taking it, the forefoot pain increased.  Patient has decreased his physical activities, only doing recumbent biking due to multiple areas of arthritis in his body.  Patient has been doing stretching at home consistently.  No new traumas reported.  The metatarsal pads added to his orthotics that have worn out uppers have helped to some degree as well.

## 2022-09-27 ENCOUNTER — APPOINTMENT (OUTPATIENT)
Dept: PODIATRY | Facility: CLINIC | Age: 57
End: 2022-09-27

## 2022-10-11 ENCOUNTER — APPOINTMENT (OUTPATIENT)
Dept: PODIATRY | Facility: CLINIC | Age: 57
End: 2022-10-11
Payer: COMMERCIAL

## 2022-10-11 VITALS — HEIGHT: 69 IN | BODY MASS INDEX: 29.62 KG/M2 | WEIGHT: 200 LBS

## 2022-10-11 PROCEDURE — 20550 NJX 1 TENDON SHEATH/LIGAMENT: CPT | Mod: 50

## 2022-10-17 NOTE — ASSESSMENT
[FreeTextEntry1] : Impression: Pain in right foot, pain in left foot (M79). IIdiopathic neuropathy. Hammertoes (M20.4). Metatarsalgia, bilateral (77.4). Plantar fasciitis, bilateral (M72.2). Hyperkeratosis, bilateral (L85.1). Bilateral capsulitis (M77.5).\par \par Treatment: Advised patient to continue with physical therapy and daily stretching exercises.  I replaced the metatarsal pads in his custom orthotics; advised him to see the orthotist for refurbishment as well.  \par Patient is interested in bilateral plantar fasciitis injection.  Advised patient to do one at a time; however, he said that he has had bilateral injections done in the past without any negative effects.  \par Patient was placed in the treatment chair in supine position. After obtaining verbal consent time, out was performed to the identified area of maximal tenderness over the plantare fascia. The foot was prepped utilizing 70% alcohol solution. \par A local injection consisting of 1cc of 0.5% Bupivacaine, 4mg of Dexamethasone and 2.5mg Triamcinolone acetate was infiltrated from the medial aspect of the heel into the plantar fascia. Improvement in pain was observed following the injection. Patient was cautioned against high impact activity as it may cause fascial rupture.\par Patient was advised to apply intermittent ice when he gets home.\par Return: one month. \par \par

## 2022-10-17 NOTE — PHYSICAL EXAM
[Ankle Swelling (On Exam)] : present [Ankle Swelling Bilaterally] : bilaterally  [Ankle Swelling On The Right] : mild [2+] : left foot dorsalis pedis 2+ [Vibration Dec.] : diminished vibratory sensation at the level of the toes [Position Sense Dec.] : diminished position sense at the level of the toes [Diminished Throughout Right Foot] : diminished sensation with monofilament testing throughout right foot [Diminished Throughout Left Foot] : diminished sensation with monofilament testing throughout left foot [FreeTextEntry3] : CFT: Instantaneous.  Temperature gradient: warm to cool. [de-identified] : Semiflexible hammertoes 2 through 5, bilaterally.  Pain on palpation of the 2nd met. heads, bilaterally with mild swelling.  Pain on palpation medial calcaneal tubercles, bilaterally.  Negative Muldner's click of all interspaces, bilaterally.  Mild Tailor's bunions, bilaterally.  Less pain on palpation at the 2nd MPJ's compared to prior exam.   [FreeTextEntry1] : Decreased sharp/dull.  Decreased light touch.  Decreased hot/cold.  Positive Paresthesias.  5.07 monofilament is absent on the right 3, 5, submet. 2 and submet. 5 absent.

## 2022-10-17 NOTE — HISTORY OF PRESENT ILLNESS
[Sneakers] : wai [FreeTextEntry1] : Patient presents today for management of bilateral plantar fasciitis and 2nd metatarsal capsulitis as well as metatarsalgia, bilaterally.  Patient states that the metatarsalgia and capsulitis on the left has improved by about 35% since injection given last visit.  It has become more tolerable.  He has scheduled an appointment with physical therapy but he has not started it yet.  He will start this week.   He has been doing stretching exercises at home and wearing supportive shoes with orthotics.  He has not scheduled the appointment with the orthotist yet to get his orthotics refurbished.  \par

## 2022-11-14 ENCOUNTER — APPOINTMENT (OUTPATIENT)
Dept: PODIATRY | Facility: CLINIC | Age: 57
End: 2022-11-14
Payer: COMMERCIAL

## 2022-11-14 PROCEDURE — 11719 TRIM NAIL(S) ANY NUMBER: CPT | Mod: 59

## 2022-11-14 PROCEDURE — 11056 PARNG/CUTG B9 HYPRKR LES 2-4: CPT

## 2022-11-14 PROCEDURE — 20550 NJX 1 TENDON SHEATH/LIGAMENT: CPT | Mod: 59,LT

## 2022-11-17 ENCOUNTER — APPOINTMENT (OUTPATIENT)
Dept: PODIATRY | Facility: CLINIC | Age: 57
End: 2022-11-17

## 2022-11-17 PROCEDURE — 20550 NJX 1 TENDON SHEATH/LIGAMENT: CPT | Mod: LT

## 2022-11-25 NOTE — HISTORY OF PRESENT ILLNESS
[Sneakers] : wai [FreeTextEntry1] : Patient with neuropathy presents today for management of painful toenails, calluses and bilateral plantar fasciitis.  The fasciitis has been doing well after the last injection, bilaterally; however, most recently, in the past week, the patient has started to feel more pain in the right heel again.  Pain: 10/10.  \par Patient has been following with his spinal doctor, neurologist and pain management doctor.  He does have compressed nerves causing significant pain; however, has had multiple back surgeries and epidurals without any improvement.  \par Patient has been going to physical therapy for his plantar fasciitis.  He has been wearing shoes with a small heel and custom orthotics.  He has been doing stretching exercises at home as well.

## 2022-11-25 NOTE — PHYSICAL EXAM
[Ankle Swelling (On Exam)] : present [Ankle Swelling Bilaterally] : bilaterally  [Ankle Swelling On The Right] : mild [2+] : left foot dorsalis pedis 2+ [Vibration Dec.] : diminished vibratory sensation at the level of the toes [Position Sense Dec.] : diminished position sense at the level of the toes [Diminished Throughout Right Foot] : diminished sensation with monofilament testing throughout right foot [Diminished Throughout Left Foot] : diminished sensation with monofilament testing throughout left foot [FreeTextEntry3] : CFT: Instantaneous.  Temperature gradient: warm to cool. [de-identified] : Semiflexible hammertoes 2 through 5, bilaterally.  There is no swelling.  No pain on palpation medial calcaneal tubercle, right.  There is significant pain on palpation of the left plantar calcaneal tubercle.  Negative Muldner's click of all interspaces, bilaterally.  Mild Tailor's bunions, bilaterally.   [FreeTextEntry1] : Decreased sharp/dull.  Decreased light touch.  Decreased hot/cold.  Positive Paresthesias.  5.07 monofilament is absent on the right 3, 5, submet. 2 and submet. 5 absent.

## 2022-11-25 NOTE — ASSESSMENT
[FreeTextEntry1] : Impression: Plantar fasciitis, bilateral (M72.2).  IIdiopathic neuropathy (G62.9).  Hyperkeratosis, bilateral (L85.1).  Nail dystrophy (L60.3).\par \par Treatment: For the fasciitis, I advised patient to continue with physical therapy, stretching exercises, proper shoe gear and his orthotics.  Patient was interested in an injection into the right heel.  \par Location: Plantar aspect of the right heel.\par Patient was placed in the treatment chair in supine position.  After obtaining verbal consent, time out was performed to the identified area of maximal tenderness.  The foot was prepped utilizing 70% alcohol solution.  A local injection consisting of 1cc of Bupivacaine, 4mg dexamethasone and 2.5mg Triamcinolone acetate was infiltrated at the site of maximum tenderness over the plantar medial aspect of the affected heel with immediate relief noted upon weight bearing. Injection site was covered with Band-Aid.  \par Patient was advised on the potential of a steroid flare following an injection, advised to apply ice to the area. Patient was instructed on the potential of a fascial rupture, advised to avoid high impact physical activities. \par Advised patient to continue follow up with pain management doctor and his neurologist as his back issues could be exacerbating the fasciitis pain.  \par Nails 1 through 5, bilateral were trimmed to hygienic lengths.  For the hyperkeratoses, the lesions were enucleated and shaved with a sterile #23 blade.  Patient is to continue neuropathic foot precautions.  \par Return: 64 days.  \par

## 2022-11-25 NOTE — ASSESSMENT
[FreeTextEntry1] : Impression: Plantar fasciitis, bilateral (M72.2).  Idiopathic neuropathy (G62.9).  \par \par Treatment: Patient was advised to continue with conservative therapy, physical therapy, stretching exercises, proper shoe gear and his orthotics.  Patient was interested in an injection into the left heel.  \par Location: Plantar aspect of the left heel.\par Patient was placed in the treatment chair in supine position.  After obtaining verbal consent, time out was performed to the identified area of maximal tenderness.  The foot was prepped utilizing 70% alcohol solution.  A local injection consisting of 1cc of Bupivacaine, 4mg dexamethasone and 2.5mg Triamcinolone acetate was infiltrated at the site of maximum tenderness over the plantar medial aspect of the affected heel with immediate relief noted upon weight bearing. Injection site was covered with Band-Aid.  \par Patient was advised on the potential of a steroid flare following an injection, advised to apply ice to the area. Patient was instructed on the potential of a fascial rupture, advised to avoid high impact physical activities. \par Advised patient to continue follow up with pain management doctor and his neurologist as his back issues could be exacerbating the fasciitis pain.  \par Return: 64 days.

## 2022-11-25 NOTE — HISTORY OF PRESENT ILLNESS
[Sneakers] : wai [FreeTextEntry1] : Patient presents today for management of plantar fasciitis.  Patient is doing much better with the right plantar fascial injection.  Now, the left side is hurting 10/10.  He has to stop when he walks because of the pain.  Patient has a history of neuropathy.   He has been going to physical therapy for the plantar fasciitis and wearing shoes with a small heel and custom orthotics.  He has been performing stretching exercises daily and before going on walks. \par

## 2022-11-25 NOTE — PHYSICAL EXAM
[Ankle Swelling (On Exam)] : present [Ankle Swelling Bilaterally] : bilaterally  [Ankle Swelling On The Right] : mild [2+] : left foot dorsalis pedis 2+ [Vibration Dec.] : diminished vibratory sensation at the level of the toes [Position Sense Dec.] : diminished position sense at the level of the toes [Diminished Throughout Right Foot] : diminished sensation with monofilament testing throughout right foot [Diminished Throughout Left Foot] : diminished sensation with monofilament testing throughout left foot [FreeTextEntry3] : CFT: Instantaneous.  Temperature gradient: warm to cool. [de-identified] : Semiflexible hammertoes 2 through 5, bilaterally.  Pain of the  2nd met. heads has resolved.  There is no swelling.  Pain on palpation medial calcaneal tubercles, bilaterally.  Negative Muldner's click of all interspaces, bilaterally.  Mild Tailor's bunions, bilaterally.  Less pain on palpation at the 2nd MPJ's compared to prior exam.   [FreeTextEntry1] : Decreased sharp/dull.  Decreased light touch.  Decreased hot/cold.  Positive Paresthesias.  5.07 monofilament is absent on the right 3, 5, submet. 2 and submet. 5 absent.

## 2023-01-18 ENCOUNTER — APPOINTMENT (OUTPATIENT)
Dept: PODIATRY | Facility: CLINIC | Age: 58
End: 2023-01-18
Payer: COMMERCIAL

## 2023-01-18 DIAGNOSIS — M79.672 PAIN IN RIGHT FOOT: ICD-10-CM

## 2023-01-18 DIAGNOSIS — M79.671 PAIN IN RIGHT FOOT: ICD-10-CM

## 2023-01-18 DIAGNOSIS — M20.40 OTHER HAMMER TOE(S) (ACQUIRED), UNSPECIFIED FOOT: ICD-10-CM

## 2023-01-18 PROCEDURE — 11056 PARNG/CUTG B9 HYPRKR LES 2-4: CPT

## 2023-01-18 PROCEDURE — 20550 NJX 1 TENDON SHEATH/LIGAMENT: CPT | Mod: 50,59

## 2023-01-24 PROBLEM — M79.671 PAIN IN BOTH FEET: Status: ACTIVE | Noted: 2022-09-15

## 2023-01-26 PROBLEM — M20.40 HAMMERTOE: Status: ACTIVE | Noted: 2023-01-24

## 2023-01-26 NOTE — PHYSICAL EXAM
[Ankle Swelling (On Exam)] : present [Ankle Swelling Bilaterally] : bilaterally  [Ankle Swelling On The Right] : mild [2+] : left foot dorsalis pedis 2+ [Vibration Dec.] : diminished vibratory sensation at the level of the toes [Position Sense Dec.] : diminished position sense at the level of the toes [FreeTextEntry3] : CFT: Instantaneous. Temperature gradient: warm to cool.  [de-identified] : Semiflexible hammertoes 2 through 5, bilaterally. There is no swelling. No pain on palpation medial calcaneal tubercle, right. There is significant pain on palpation of the left plantar calcaneal tubercle. Negative Muldner's click of all interspaces, bilaterally. Mild Tailor's bunions, bilaterally. \par  [FreeTextEntry1] : Thin, dry skin. \par Right dorsal foot nevus with regular border; brown in color and it measures 1.5 x 1cm. Hyperkeratotic, painful lesions, submet. 5, bilateral; left side sub. styloid. \par Nails x 10 are thickened up to 2 mm, hardened, distally. No subungual debris. \par

## 2023-01-26 NOTE — ASSESSMENT
[FreeTextEntry1] : \par \par Impression: Plantar fasciitis, bilateral. MARY. Tae. Pain bilateral foot.\par \par Treatment: At this time I recommend patient get a night splint for his plantar fasciitis however he states that he has insomnia and difficulty sleeping and would likely not be able to sleep with a device on. Patient was referred to the orthotist for a new pair of supportive functional orthotics. Patient elected to proceed with bilateral plantar fasciitis injection. \par Location: Bilateral plantar aspects of heels\par Patient was placed in the treatment chair in supine position. After obtaining verbal consent time out was performed to the identified area of maximal tenderness. The left and right foot was prepped utilizing 70% alcohol solution. A local injection consisting of 1cc of Bupivacaine, 4mg Dexamethasone, and 5mg Triamcinolone was infiltrated at the site of maximum tenderness over the plantar medial aspect of the both heels with immediate relief noted upon weight bearing. Injection site was covered with band-aid. Patient was advised on the potential of a steroid flare following an injection, advised to apply ice to the area. Patient was instructed on the potential of a fascial rupture, advised to avoid high impact physical activities. \par For bilateral hyperkeratoses, they were trimmed with a sterile #23 blde. Continue following up with his pain management and neurologist. I will see him back in 2 months.

## 2023-01-26 NOTE — HISTORY OF PRESENT ILLNESS
[Sneakers] : wai [FreeTextEntry1] : Patient presents today for management of bilateral plantar fasciitis, right worse than left. The right is 11/10 and the left is a 9/10. Patient has been doing stretching exercises. He had physical therapy. He is wearing his orthotics however they are old and worn out. The only thing that helps him is injection steroid therapy. Patient is also complaining of pain underneath his areas of hyperkeratotic skin bilaterally. Patient has neuropathy and sciatica. He following with his pain management doctor regularly. He finds when his sciatica is exacerbated the pain on the right side is exacerbated as well. He has tried epidurals in the past for sciatica without any improvement. He prefers not to have surgical intervention for this.

## 2023-03-03 ENCOUNTER — APPOINTMENT (OUTPATIENT)
Dept: PODIATRY | Facility: CLINIC | Age: 58
End: 2023-03-03
Payer: COMMERCIAL

## 2023-03-03 DIAGNOSIS — G62.9 POLYNEUROPATHY, UNSPECIFIED: ICD-10-CM

## 2023-03-03 DIAGNOSIS — L60.3 NAIL DYSTROPHY: ICD-10-CM

## 2023-03-03 PROCEDURE — 11719 TRIM NAIL(S) ANY NUMBER: CPT | Mod: 59

## 2023-03-03 PROCEDURE — 20550 NJX 1 TENDON SHEATH/LIGAMENT: CPT | Mod: 50

## 2023-03-06 PROBLEM — G62.9 SMALL FIBER NEUROPATHY: Status: ACTIVE | Noted: 2018-09-07

## 2023-03-06 PROBLEM — L60.3 NAIL DYSTROPHY: Status: ACTIVE | Noted: 2023-03-06

## 2023-03-06 NOTE — ASSESSMENT
[FreeTextEntry1] : Impression: Plantar fasciitis, bilateral (M72.2).  Idiopathic neuropathy (G62.9).  Onychodystrophy (L60.3)\par \par Treatment: I discussed potential negative side effects of frequent steroid injections with the patient, which may result in plantar fascial tears.  Patient states that he understands; however, injection is the only therapy that works for him.  Will proceed with another round of injections and I encouraged patient to call the orthotist regarding the status of his new custom foot orthotics.  Advised patient to obtain a new pair of sneakers as his current pair is worn out.  Continue with stretching exercises that he had learned in physical therapy.  \par Location: Plantar aspect of the bilateral heels.\par Patient was placed in the treatment chair in supine position.  After obtaining verbal consent, time out was performed to the identified area of maximal tenderness.  Both areas were prepped utilizing 70% alcohol solution.  A local injection consisting of 1cc of Bupivacaine, 4mg dexamethasone and 2.5mg Triamcinolone acetate was infiltrated at the site of maximum tenderness over the plantar medial aspect of each affected heel with immediate relief noted upon weight bearing. Injection site was covered with Band-Aid.  \par Patient was advised on the potential of a steroid flare following an injection, advised to apply ice to the area. Patient was instructed on the potential of a fascial rupture, advised to avoid high impact physical activities. \par Advised patient to continue follow up with pain management doctor and his neurologist as his back issues could be exacerbating the fasciitis pain.  \par Nails 1 through 5, bilaterally were wiped with alcohol and trimmed to hygienic lengths with sterile nippers.  Continue to follow up with Pain Management and neurology.  \par Return: 2 months.

## 2023-03-06 NOTE — HISTORY OF PRESENT ILLNESS
[Sneakers] : wai [FreeTextEntry1] : Patient presents today for follow up of bilateral plantar fasciitis, right worse than left.  Both are now significantly painful, limiting patient's ambulation.  He has been doing daily stretching exercises.  He has had physical therapy.  He is currently waiting for custom foot orthotics that he ordered from the orthotist; they have not come in yet.  Patient would like another steroid injection into bilateral heels.  \par Patient is also complaining of elongated toenails.  He has a history of neuropathy and sciatica.  He follows with Pain Management regularly.

## 2023-03-06 NOTE — REASON FOR VISIT
[Follow-Up Visit] : a follow-up visit for [Plantar Fasciitis] : plantar fasciitis [FreeTextEntry2] : bilateral

## 2023-03-06 NOTE — PHYSICAL EXAM
[Ankle Swelling (On Exam)] : present [Ankle Swelling Bilaterally] : bilaterally  [Ankle Swelling On The Right] : mild [2+] : left foot dorsalis pedis 2+ [Vibration Dec.] : diminished vibratory sensation at the level of the toes [Position Sense Dec.] : diminished position sense at the level of the toes [Diminished Throughout Right Foot] : diminished sensation with monofilament testing throughout right foot [Diminished Throughout Left Foot] : diminished sensation with monofilament testing throughout left foot [FreeTextEntry3] : CFT: Instantaneous.  Temperature gradient: warm to cool. [de-identified] : Semiflexible hammertoes 2 through 5, bilaterally.  Pain of the  2nd met. heads has resolved.  There is no swelling.  Pain on palpation medial calcaneal tubercle, right.  There is significant pain on palpation of the left plantar calcaneal tubercle.  Negative Muldner's click of all interspaces, bilaterally.  Mild Tailor's bunions, bilaterally.  No pain on palpation at the 2nd MPJ's compared to prior exam.  \par No swelling or redness.  Plantar fascia is taut.   [FreeTextEntry1] : Decreased sharp/dull.  Decreased light touch.  Decreased hot/cold.  Positive Paresthesias.  5.07 monofilament is absent on the right 3, 5, submet. 2 and submet. 5 absent.

## 2023-04-20 ENCOUNTER — APPOINTMENT (OUTPATIENT)
Dept: PODIATRY | Facility: CLINIC | Age: 58
End: 2023-04-20
Payer: COMMERCIAL

## 2023-04-20 DIAGNOSIS — S90.112A CONTUSION OF LEFT GREAT TOE W/OUT DAMAGE TO NAIL, INITIAL ENCOUNTER: ICD-10-CM

## 2023-04-20 PROCEDURE — 73630 X-RAY EXAM OF FOOT: CPT | Mod: LT

## 2023-04-20 PROCEDURE — 99213 OFFICE O/P EST LOW 20 MIN: CPT | Mod: 25

## 2023-05-02 PROBLEM — S90.112A CONTUSION OF LEFT GREAT TOE WITHOUT DAMAGE TO NAIL: Status: ACTIVE | Noted: 2023-04-27

## 2023-09-20 ENCOUNTER — APPOINTMENT (OUTPATIENT)
Dept: PODIATRY | Facility: CLINIC | Age: 58
End: 2023-09-20
Payer: COMMERCIAL

## 2023-09-20 PROCEDURE — 20550 NJX 1 TENDON SHEATH/LIGAMENT: CPT | Mod: 50

## 2023-12-18 ENCOUNTER — APPOINTMENT (OUTPATIENT)
Dept: PODIATRY | Facility: CLINIC | Age: 58
End: 2023-12-18
Payer: COMMERCIAL

## 2023-12-18 DIAGNOSIS — L85.1 ACQUIRED KERATOSIS [KERATODERMA] PALMARIS ET PLANTARIS: ICD-10-CM

## 2023-12-18 DIAGNOSIS — M11.20 OTHER CHONDROCALCINOSIS, UNSPECIFIED SITE: ICD-10-CM

## 2023-12-18 PROCEDURE — 11056 PARNG/CUTG B9 HYPRKR LES 2-4: CPT

## 2023-12-18 PROCEDURE — 20550 NJX 1 TENDON SHEATH/LIGAMENT: CPT | Mod: 50,59

## 2023-12-18 RX ORDER — PREDNISONE 20 MG/1
20 TABLET ORAL
Qty: 30 | Refills: 0 | Status: COMPLETED | COMMUNITY
Start: 2022-09-06 | End: 2023-12-18

## 2023-12-18 RX ORDER — METHYLPREDNISOLONE 4 MG/1
4 TABLET ORAL
Qty: 1 | Refills: 0 | Status: COMPLETED | COMMUNITY
Start: 2023-04-20 | End: 2023-12-18

## 2023-12-18 RX ORDER — PREDNISONE 5 MG
5 TABLET, DOSE PACK ORAL
Refills: 0 | Status: ACTIVE | COMMUNITY

## 2023-12-19 PROBLEM — L85.1 KERATODERMA, ACQUIRED: Status: ACTIVE | Noted: 2023-01-24

## 2023-12-21 RX ORDER — PREDNISONE 2.5 MG/1
2.5 TABLET ORAL
Qty: 30 | Refills: 0 | Status: COMPLETED | COMMUNITY
Start: 2023-09-22

## 2023-12-21 RX ORDER — PREDNISONE 5 MG/1
5 TABLET ORAL
Qty: 30 | Refills: 0 | Status: COMPLETED | COMMUNITY
Start: 2023-12-11

## 2024-01-23 ENCOUNTER — APPOINTMENT (OUTPATIENT)
Dept: PODIATRY | Facility: CLINIC | Age: 59
End: 2024-01-23
Payer: COMMERCIAL

## 2024-01-23 PROCEDURE — 99213 OFFICE O/P EST LOW 20 MIN: CPT

## 2024-01-23 RX ORDER — METHYLPREDNISOLONE 4 MG/1
4 TABLET ORAL
Qty: 1 | Refills: 0 | Status: ACTIVE | COMMUNITY
Start: 2024-01-23 | End: 1900-01-01

## 2024-01-23 RX ORDER — DICLOFENAC SODIUM 1 %
1 KIT TOPICAL
Refills: 0 | Status: COMPLETED | COMMUNITY
End: 2024-01-23

## 2024-02-26 ENCOUNTER — APPOINTMENT (OUTPATIENT)
Dept: PODIATRY | Facility: CLINIC | Age: 59
End: 2024-02-26
Payer: COMMERCIAL

## 2024-02-26 DIAGNOSIS — G60.9 HEREDITARY AND IDIOPATHIC NEUROPATHY, UNSPECIFIED: ICD-10-CM

## 2024-02-26 PROCEDURE — 20550 NJX 1 TENDON SHEATH/LIGAMENT: CPT | Mod: 50

## 2024-02-26 PROCEDURE — 99212 OFFICE O/P EST SF 10 MIN: CPT | Mod: 25

## 2024-02-27 PROBLEM — G60.9 IDIOPATHIC SMALL FIBER PERIPHERAL NEUROPATHY: Status: ACTIVE | Noted: 2023-12-18

## 2024-03-01 NOTE — PHYSICAL EXAM
[Ankle Swelling (On Exam)] : present [Ankle Swelling Bilaterally] : bilaterally  [Ankle Swelling On The Right] : mild [2+] : left foot dorsalis pedis 2+ [Vibration Dec.] : diminished vibratory sensation at the level of the toes [Position Sense Dec.] : diminished position sense at the level of the toes [Diminished Throughout Right Foot] : diminished sensation with monofilament testing throughout right foot [Diminished Throughout Left Foot] : diminished sensation with monofilament testing throughout left foot [FreeTextEntry3] : CFT: Instantaneous.  Temperature gradient: warm to cool. [de-identified] : Semiflexible hammertoes 2 through 5, bilaterally.   Pain on palpation of bilateral aspects of the medial calcaneal tubercle.  Negative Muldner's click of all interspaces, bilaterally.  Mild Tailor's bunions, bilaterally.  No swelling or redness.  Plantar fascia is taut.    No joint effusions or pedal edema is present.   [FreeTextEntry1] : Decreased sharp/dull.  Decreased light touch.  Decreased hot/cold.  Positive Paresthesias.  5.07 monofilament is absent on the right 3, 5, submet. 2 and submet. 5 absent.

## 2024-03-01 NOTE — ASSESSMENT
[FreeTextEntry1] : Impression: Bilateral plantar fasciitis (M72.2).  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  He states that he will try.  Also, advised patient to ice daily and continue wearing supportive shoe gear.  Continue with consistent stretching.   Location: Bilateral plantar heels.   Patient was placed in the treatment chair in supine position.  After obtaining verbal consent, a time out was performed to the identified area of maximal tenderness.  Both feet were prepped utilizing 70% alcohol solution.  A local injection consisting of 1cc of 2.5% Marcaine, 4mg Dexamethasone, and 10mg Triamcinolone was infiltrated at the site of maximum tenderness over the plantar medial aspect of each heel with immediate relief noted upon weight bearing. Injection site was covered with Band-Aid.  Patient was advised on the potential of a steroid flare following an injection, advised to apply ice to the area.  Patient was instructed on the potential of a fascial rupture, advised to avoid high impact physical activities. Advised patient to bring in the last neurology note at one month follow up after the injection.  Will consider ordering an MRI of the heels at that time.   Return: One month.

## 2024-03-01 NOTE — HISTORY OF PRESENT ILLNESS
[FreeTextEntry1] : Patient returns today for follow up of bilateral plantar fasciitis.  He states that the oral Medrol dose pack decreased the pain while he was on it; however, once he came off of it, the pain recurred and now the left is more painful than the right.  He denies any precipitating events.  He has decreased his physical activities due to the pain.  He wears custom foot orthotics daily.  He stretches daily and wears sneakers.  He does not ice consistently.  He has an appointment coming up with his neurologist in 2 weeks.  He also reports increasing sciatica symptoms on the right and new sciatica symptoms on the left.  Patient denies any other medical changes.

## 2024-03-04 ENCOUNTER — APPOINTMENT (OUTPATIENT)
Dept: PODIATRY | Facility: CLINIC | Age: 59
End: 2024-03-04
Payer: COMMERCIAL

## 2024-03-04 DIAGNOSIS — S93.602A UNSPECIFIED SPRAIN OF LEFT FOOT, INITIAL ENCOUNTER: ICD-10-CM

## 2024-03-04 PROCEDURE — 73630 X-RAY EXAM OF FOOT: CPT | Mod: LT

## 2024-03-04 PROCEDURE — 99213 OFFICE O/P EST LOW 20 MIN: CPT

## 2024-03-05 PROBLEM — S93.602A UNSPECIFIED SPRAIN OF LEFT FOOT, INITIAL ENCOUNTER: Status: ACTIVE | Noted: 2022-08-18

## 2024-03-07 NOTE — ASSESSMENT
[FreeTextEntry1] : Impression: Left foot pain (M79.672).  Left hallux sprain (M79.672).  Treatment: I advised patient to rest, ice and elevate.  He is to take over-the-counter pain medication as needed.  Wear roomy shoe gear.  The pain should self-resolve in a few weeks, if not, then will reevaluate at patient's next visit after he sees neurology.

## 2024-03-07 NOTE — PHYSICAL EXAM
[Ankle Swelling (On Exam)] : present [Ankle Swelling Bilaterally] : bilaterally  [Ankle Swelling On The Right] : mild [2+] : left foot dorsalis pedis 2+ [Vibration Dec.] : diminished vibratory sensation at the level of the toes [Position Sense Dec.] : diminished position sense at the level of the toes [Diminished Throughout Left Foot] : diminished sensation with monofilament testing throughout left foot [Diminished Throughout Right Foot] : diminished sensation with monofilament testing throughout right foot [FreeTextEntry3] : CFT: Instantaneous.  Temperature gradient: warm to cool. [de-identified] : Semiflexible hammertoes 2 through 5, bilaterally.   Pain on palpation of bilateral aspects of the medial calcaneal tubercle.  Negative Muldner's click of all interspaces, bilaterally.  Mild Tailor's bunions, bilaterally.  No swelling or redness.  Plantar fascia is taut.    No joint effusions or pedal edema is present.   Left hallux pain on palpation at the base of the proximal phalanx.  No 1st MPJ restriction, crepitus or pain with ROM.  No edema or joint effusions present.   [FreeTextEntry1] : Decreased sharp/dull.  Decreased light touch.  Decreased hot/cold.  Positive Paresthesias.  5.07 monofilament is absent on the right 3, 5, submet. 2 and submet. 5 absent.

## 2024-03-07 NOTE — HISTORY OF PRESENT ILLNESS
[FreeTextEntry1] : Patient returns today with a new complaint of left hallux pain.  Patient stated that a few weeks ago, he banged his foot against a piece of furniture.  He did not notice any swelling, redness, or ecchymosis.  However, since then, he has pain when he ambulates, which has not been improving.  Patient is S/P bilateral heel injections.  He states that the left heel has not improved in pain.  He has an appointment with neurology and pain management coming up.

## 2024-03-07 NOTE — PROCEDURE
[FreeTextEntry1] : X-rays were taken of the left foot to evaluate for any underlying fractures. (3 views - weight bearing) No acute fractures/dislocations or erosions present.  Hammered hallux present.  No 1st MPJ OCD's or joint narrowing present.

## 2024-03-19 ENCOUNTER — APPOINTMENT (OUTPATIENT)
Dept: ANESTHESIOLOGY | Facility: CLINIC | Age: 59
End: 2024-03-19

## 2024-03-19 ENCOUNTER — OUTPATIENT (OUTPATIENT)
Dept: OUTPATIENT SERVICES | Facility: HOSPITAL | Age: 59
LOS: 1 days | End: 2024-03-19
Payer: COMMERCIAL

## 2024-03-19 DIAGNOSIS — M54.16 RADICULOPATHY, LUMBAR REGION: ICD-10-CM

## 2024-03-19 DIAGNOSIS — M54.17 RADICULOPATHY, LUMBOSACRAL REGION: ICD-10-CM

## 2024-03-19 PROCEDURE — 64483 NJX AA&/STRD TFRM EPI L/S 1: CPT

## 2024-03-25 ENCOUNTER — APPOINTMENT (OUTPATIENT)
Dept: PODIATRY | Facility: CLINIC | Age: 59
End: 2024-03-25

## 2024-05-31 ENCOUNTER — APPOINTMENT (OUTPATIENT)
Dept: PODIATRY | Facility: CLINIC | Age: 59
End: 2024-05-31
Payer: COMMERCIAL

## 2024-05-31 DIAGNOSIS — M72.2 PLANTAR FASCIAL FIBROMATOSIS: ICD-10-CM

## 2024-05-31 DIAGNOSIS — M79.672 PAIN IN LEFT FOOT: ICD-10-CM

## 2024-05-31 PROCEDURE — 20550 NJX 1 TENDON SHEATH/LIGAMENT: CPT | Mod: 50

## 2024-05-31 NOTE — ASSESSMENT
[FreeTextEntry1] : Impression: Bilateral plantar fasciitis (M72.2).  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  Continue with conservative therapy for plantar fasciitis.   Location: Plantar heels, bilateral.   Patient was placed in the treatment chair in supine position.  After obtaining verbal consent, time out was performed to the identified area of maximal tenderness.  The foot was prepped utilizing 70% alcohol solution.  A local injection consisting of 1cc of 1% Lidocaine, 4mg dexamethasone and 5mg Triamcinolone acetate was infiltrated at the site of maximum tenderness over the plantar medial aspect of each heel with immediate relief noted upon weight bearing. Injection site was covered with Band-Aid.   Patient was advised on the potential of a steroid flare following an injection, advised to apply ice to the area. Patient was instructed on the potential of a fascial rupture, advised to avoid high impact physical activities.  Continue stretching exercises and wearing the supportive shoe gear with orthotics.    Bilateral hyperkeratoses were wiped with alcohol and shaved with sterile #15 blade with improvement in pain.   Return: As needed.

## 2024-05-31 NOTE — PHYSICAL EXAM
[Ankle Swelling (On Exam)] : present [Ankle Swelling Bilaterally] : bilaterally  [Ankle Swelling On The Right] : mild [2+] : left foot dorsalis pedis 2+ [Vibration Dec.] : diminished vibratory sensation at the level of the toes [Position Sense Dec.] : diminished position sense at the level of the toes [Diminished Throughout Right Foot] : diminished sensation with monofilament testing throughout right foot [Diminished Throughout Left Foot] : diminished sensation with monofilament testing throughout left foot [FreeTextEntry3] : CFT: Instantaneous.  Temperature gradient: warm to cool. [de-identified] : Semiflexible hammertoes 2 through 5, bilaterally.   Pain on palpation of bilateral aspects of the medial calcaneal tubercle.  Negative Muldner's click of all interspaces, bilaterally.  Mild Tailor's bunions, bilaterally.  No swelling or redness.  Plantar fascia is taut.     [FreeTextEntry1] : Decreased sharp/dull.  Decreased light touch.  Decreased hot/cold.  Positive Paresthesias.  5.07 monofilament is absent on the right 3, 5, submet. 2 and submet. 5 absent.

## 2024-05-31 NOTE — HISTORY OF PRESENT ILLNESS
[Sneakers] : wai [FreeTextEntry1] : Patient returns today for follow up of bilateral plantar fasciitis.  Patient is interested in injection therapy as well as for painful hyperkeratoses, bilateral.  Patient states that he has been using custom orthotics, supportive shoe gear and stretching exercises.  He has gone to physical therapy in the past and continues to do the stretches he learned there, at home.  He is starting to notice increasing pain again and the steroid injection is the only therapy that consistently provides relief.  Denies any acute trauma.  Patient denies any new medical changes.  Complains of the same level of numbness due to idiopathic neuropathy.  He does not currently follow with a neurologist.  He has gone to pain management in the past for back, knee and elbow pain.  Currently follows with a rheumatologist and was recently diagnosed with pseudo-gout.

## 2024-05-31 NOTE — HISTORY OF PRESENT ILLNESS
[Sneakers] : wai [FreeTextEntry1] : Patient presents today with a new complaint of left hallux pain.  Symptoms started yesterday after he dropped a box on it in the morning.  He felt immediate pain and noticed some skin abrasion.  The pain has improved since then; however, not fully resolved.  Patient is concerned for fracture.   Patient is also here for follow up of bilateral plantar fasciitis.  He states that the heels are starting to hurt more now.  He has been doing stretching and he has obtained a new pair of custom foot orthotics.  He wears them in conjunction with the old pair in different shoes.   He follows regularly with Pain Management for neuropathy and sciatica.

## 2024-05-31 NOTE — PROCEDURE
[FreeTextEntry1] : X-rays were taken of the left foot to evaluate for any underlying fracture.\par (3 views - weight bearing) No acute fracture/dislocation or erosions noted.

## 2024-05-31 NOTE — ASSESSMENT
[FreeTextEntry1] : Impression: Bilateral plantar fasciitis (M72.2).  Neuropathy.  Contusion, left hallux (S90.112A).\par \par Treatment: Discussed etiology and treatment options. \par I advised patient to decrease his high impact physical activities for now until the pain resolves.  I advised patient to apply antibiotic cream and a Band-Aid for one week and monitor for any signs of infection over the abrasion.  \par The patient does not have a fracture.  \par For the bilateral plantar fasciitis, will defer on repeat injection at this time as patient has been getting them quite frequently and he is at-risk for plantar fascial rupture.  Instead, patient was sent a Medrol dose pack.\par Return: One month.

## 2024-05-31 NOTE — PHYSICAL EXAM
[Ankle Swelling (On Exam)] : present [Ankle Swelling Bilaterally] : bilaterally  [Ankle Swelling On The Right] : mild [2+] : left foot dorsalis pedis 2+ [Vibration Dec.] : diminished vibratory sensation at the level of the toes [Position Sense Dec.] : diminished position sense at the level of the toes [Diminished Throughout Right Foot] : diminished sensation with monofilament testing throughout right foot [Diminished Throughout Left Foot] : diminished sensation with monofilament testing throughout left foot [FreeTextEntry3] : CFT: Instantaneous.  Temperature gradient: warm to cool. [de-identified] : Semiflexible hammertoes 2 through 5, bilaterally.  \par Pain on palpation of bilateral aspects of the medial calcaneal tubercle.  Negative Muldner's click of all interspaces, bilaterally.  Mild Tailor's bunions, bilaterally.  No swelling or redness.  Plantar fascia is taut.  \par Pain on palpation of left hallux, distal phalanx with small skin abrasion, which does not probe deep; does not probe to bone and no clinical signs of infection.   [FreeTextEntry1] : Decreased sharp/dull.  Decreased light touch.  Decreased hot/cold.  Positive Paresthesias.  5.07 monofilament is absent on the right 3, 5, submet. 2 and submet. 5 absent.

## 2024-06-03 PROBLEM — M79.672 LEFT FOOT PAIN: Status: ACTIVE | Noted: 2024-03-05

## 2024-06-03 PROBLEM — M72.2 PLANTAR FASCIAL FIBROMATOSIS: Status: ACTIVE | Noted: 2022-08-18

## 2024-06-07 NOTE — HISTORY OF PRESENT ILLNESS
[Sneakers] : wai [FreeTextEntry1] : Patient presents today with a complaint of bilateral heel pain.  He thinks that he has recurrent plantar fasciitis.  He has had cortisone injection in the past, bilaterally.  Patient has orthotics, he has tried stretching, night splints, elevations and multiple medications.  He had an allergic reaction to Mobic. He has a follow up appointment with the neurologist next week and is scheduled for EMG-NCV testing to rule out neurologic conditions as the underlying cause for his pain.  Patient states that he stretches a lot.  He has pain with both feet, first thing in the morning getting out of bed as well as throughout the day.  He gets burning, plantar aspect of both feet.  He has recently redeveloped this problem.  Patient is retired and spends most of his time outside.  Patient states that his only relief comes from the cortisone injections.

## 2024-06-07 NOTE — PHYSICAL EXAM
[Ankle Swelling (On Exam)] : present [Ankle Swelling Bilaterally] : bilaterally  [Ankle Swelling On The Right] : mild [2+] : left foot dorsalis pedis 2+ [Vibration Dec.] : diminished vibratory sensation at the level of the toes [Position Sense Dec.] : diminished position sense at the level of the toes [Diminished Throughout Right Foot] : diminished sensation with monofilament testing throughout right foot [Diminished Throughout Left Foot] : diminished sensation with monofilament testing throughout left foot [FreeTextEntry3] : CFT: Instantaneous.  Temperature gradient: warm to cool. [de-identified] : Prominent 5th metatarsal styloid process, left foot.  Hammertoe deformities, 2 to 4, bilateral.  Pes planus with forefoot varus, compensated.  Prominent metatarsal heads, submet. 5, bilateral as well as fat pad atrophy.  Mild equinus deformity, bilateral.  Pain on palpation, plantar medial tubercle of the calcaneus, bilateral, right greater than left.  No pain along the plantar fascial band with negative pain on palpation of the posterior tibial tendon.  No pain on medial and lateral compression of the calcaneus indicative of a calcaneal fracture.   [FreeTextEntry4] : decreased at the 1st MPJ, left greater than right.   [FreeTextEntry1] : Positive paresthesias.  5.07 monofilament is absent on the right 3, 5, submet. 2 and submet. 5 absent.  Questionable Tinel sign, bilaterally.  Patient has pain with the exam.

## 2024-06-11 ENCOUNTER — APPOINTMENT (OUTPATIENT)
Dept: ANESTHESIOLOGY | Facility: CLINIC | Age: 59
End: 2024-06-11

## 2024-06-11 ENCOUNTER — OUTPATIENT (OUTPATIENT)
Dept: OUTPATIENT SERVICES | Facility: HOSPITAL | Age: 59
LOS: 1 days | End: 2024-06-11
Payer: COMMERCIAL

## 2024-06-11 DIAGNOSIS — M54.17 RADICULOPATHY, LUMBOSACRAL REGION: ICD-10-CM

## 2024-06-11 PROCEDURE — 64483 NJX AA&/STRD TFRM EPI L/S 1: CPT

## 2024-09-17 ENCOUNTER — OUTPATIENT (OUTPATIENT)
Dept: OUTPATIENT SERVICES | Facility: HOSPITAL | Age: 59
LOS: 1 days | End: 2024-09-17
Payer: COMMERCIAL

## 2024-09-17 ENCOUNTER — APPOINTMENT (OUTPATIENT)
Dept: ANESTHESIOLOGY | Facility: CLINIC | Age: 59
End: 2024-09-17

## 2024-09-17 DIAGNOSIS — M54.16 RADICULOPATHY, LUMBAR REGION: ICD-10-CM

## 2024-09-17 DIAGNOSIS — M54.17 RADICULOPATHY, LUMBOSACRAL REGION: ICD-10-CM

## 2024-09-17 PROCEDURE — 64483 NJX AA&/STRD TFRM EPI L/S 1: CPT

## 2024-10-04 ENCOUNTER — APPOINTMENT (OUTPATIENT)
Dept: PODIATRY | Facility: CLINIC | Age: 59
End: 2024-10-04

## 2024-10-23 ENCOUNTER — APPOINTMENT (OUTPATIENT)
Dept: PODIATRY | Facility: CLINIC | Age: 59
End: 2024-10-23

## 2024-10-23 DIAGNOSIS — M72.2 PLANTAR FASCIAL FIBROMATOSIS: ICD-10-CM

## 2024-10-23 PROCEDURE — 20550 NJX 1 TENDON SHEATH/LIGAMENT: CPT | Mod: 50

## 2024-10-23 PROCEDURE — 99213 OFFICE O/P EST LOW 20 MIN: CPT | Mod: 25

## 2024-10-24 PROBLEM — M72.2 PLANTAR FASCIITIS, LEFT: Status: ACTIVE | Noted: 2024-10-24

## 2024-10-24 PROBLEM — M72.2 PLANTAR FASCIITIS, RIGHT: Status: ACTIVE | Noted: 2024-10-24

## 2025-01-06 ENCOUNTER — APPOINTMENT (OUTPATIENT)
Dept: PODIATRY | Facility: CLINIC | Age: 60
End: 2025-01-06
Payer: COMMERCIAL

## 2025-01-06 DIAGNOSIS — M72.2 PLANTAR FASCIAL FIBROMATOSIS: ICD-10-CM

## 2025-01-06 DIAGNOSIS — M79.672 PAIN IN RIGHT FOOT: ICD-10-CM

## 2025-01-06 DIAGNOSIS — L85.1 ACQUIRED KERATOSIS [KERATODERMA] PALMARIS ET PLANTARIS: ICD-10-CM

## 2025-01-06 DIAGNOSIS — M79.671 PAIN IN RIGHT FOOT: ICD-10-CM

## 2025-01-06 PROCEDURE — 99212 OFFICE O/P EST SF 10 MIN: CPT

## 2025-01-08 PROBLEM — L85.1 KERATODERMA, ACQUIRED: Status: ACTIVE | Noted: 2025-01-08

## 2025-04-29 ENCOUNTER — OUTPATIENT (OUTPATIENT)
Dept: OUTPATIENT SERVICES | Facility: HOSPITAL | Age: 60
LOS: 1 days | End: 2025-04-29
Payer: COMMERCIAL

## 2025-04-29 ENCOUNTER — APPOINTMENT (OUTPATIENT)
Dept: ANESTHESIOLOGY | Facility: CLINIC | Age: 60
End: 2025-04-29

## 2025-04-29 DIAGNOSIS — M54.16 RADICULOPATHY, LUMBAR REGION: ICD-10-CM

## 2025-04-29 PROCEDURE — 64483 NJX AA&/STRD TFRM EPI L/S 1: CPT

## 2025-07-07 ENCOUNTER — APPOINTMENT (OUTPATIENT)
Dept: PODIATRY | Facility: CLINIC | Age: 60
End: 2025-07-07
Payer: COMMERCIAL

## 2025-07-07 PROCEDURE — 20550 NJX 1 TENDON SHEATH/LIGAMENT: CPT | Mod: 50
